# Patient Record
Sex: MALE | Race: WHITE | NOT HISPANIC OR LATINO | Employment: OTHER | URBAN - METROPOLITAN AREA
[De-identification: names, ages, dates, MRNs, and addresses within clinical notes are randomized per-mention and may not be internally consistent; named-entity substitution may affect disease eponyms.]

---

## 2021-11-05 ENCOUNTER — HOSPITAL ENCOUNTER (OUTPATIENT)
Dept: RADIOLOGY | Facility: MEDICAL CENTER | Age: 71
End: 2021-11-05
Attending: INTERNAL MEDICINE
Payer: COMMERCIAL

## 2021-11-05 DIAGNOSIS — R91.1 SOLITARY PULMONARY NODULE: ICD-10-CM

## 2021-11-05 PROCEDURE — A9552 F18 FDG: HCPCS

## 2022-07-25 ENCOUNTER — HOSPITAL ENCOUNTER (OUTPATIENT)
Dept: RADIATION ONCOLOGY | Facility: MEDICAL CENTER | Age: 72
End: 2022-07-25
Payer: COMMERCIAL

## 2022-07-25 ENCOUNTER — HOSPITAL ENCOUNTER (OUTPATIENT)
Dept: RADIOLOGY | Facility: MEDICAL CENTER | Age: 72
End: 2022-07-25

## 2022-07-25 ENCOUNTER — HOSPITAL ENCOUNTER (OUTPATIENT)
Dept: RADIOLOGY | Facility: MEDICAL CENTER | Age: 72
End: 2022-07-25
Attending: RADIOLOGY
Payer: COMMERCIAL

## 2022-07-25 ENCOUNTER — HOSPITAL ENCOUNTER (OUTPATIENT)
Dept: RADIATION ONCOLOGY | Facility: MEDICAL CENTER | Age: 72
End: 2022-07-31
Attending: RADIOLOGY
Payer: COMMERCIAL

## 2022-07-25 VITALS
OXYGEN SATURATION: 98 % | WEIGHT: 168.43 LBS | DIASTOLIC BLOOD PRESSURE: 65 MMHG | HEIGHT: 69 IN | BODY MASS INDEX: 24.95 KG/M2 | SYSTOLIC BLOOD PRESSURE: 110 MMHG | TEMPERATURE: 97.1 F | HEART RATE: 80 BPM

## 2022-07-25 DIAGNOSIS — R05.9 COUGH: ICD-10-CM

## 2022-07-25 DIAGNOSIS — C79.31 BRAIN METASTASIS: ICD-10-CM

## 2022-07-25 DIAGNOSIS — C34.32 SMALL CELL LUNG CANCER, LEFT LOWER LOBE (HCC): ICD-10-CM

## 2022-07-25 DIAGNOSIS — C79.31 BRAIN METASTASES: ICD-10-CM

## 2022-07-25 PROCEDURE — 77470 SPECIAL RADIATION TREATMENT: CPT | Mod: 26 | Performed by: RADIOLOGY

## 2022-07-25 PROCEDURE — 77263 THER RADIOLOGY TX PLNG CPLX: CPT | Performed by: RADIOLOGY

## 2022-07-25 PROCEDURE — 77334 RADIATION TREATMENT AID(S): CPT | Performed by: RADIOLOGY

## 2022-07-25 PROCEDURE — 700117 HCHG RX CONTRAST REV CODE 255: Performed by: RADIOLOGY

## 2022-07-25 PROCEDURE — 77290 THER RAD SIMULAJ FIELD CPLX: CPT | Mod: 26 | Performed by: RADIOLOGY

## 2022-07-25 PROCEDURE — A9576 INJ PROHANCE MULTIPACK: HCPCS | Performed by: RADIOLOGY

## 2022-07-25 PROCEDURE — 99205 OFFICE O/P NEW HI 60 MIN: CPT | Mod: 25 | Performed by: RADIOLOGY

## 2022-07-25 PROCEDURE — 700111 HCHG RX REV CODE 636 W/ 250 OVERRIDE (IP): Performed by: RADIOLOGY

## 2022-07-25 PROCEDURE — 77290 THER RAD SIMULAJ FIELD CPLX: CPT | Performed by: RADIOLOGY

## 2022-07-25 PROCEDURE — 70553 MRI BRAIN STEM W/O & W/DYE: CPT

## 2022-07-25 PROCEDURE — 77470 SPECIAL RADIATION TREATMENT: CPT | Performed by: RADIOLOGY

## 2022-07-25 PROCEDURE — 77334 RADIATION TREATMENT AID(S): CPT | Mod: 26 | Performed by: RADIOLOGY

## 2022-07-25 PROCEDURE — 99214 OFFICE O/P EST MOD 30 MIN: CPT | Mod: 25 | Performed by: RADIOLOGY

## 2022-07-25 PROCEDURE — 700111 HCHG RX REV CODE 636 W/ 250 OVERRIDE (IP)

## 2022-07-25 RX ORDER — CODEINE PHOSPHATE/GUAIFENESIN 10-100MG/5
5 LIQUID (ML) ORAL 3 TIMES DAILY PRN
Qty: 236 ML | Refills: 0 | Status: SHIPPED | OUTPATIENT
Start: 2022-07-25 | End: 2022-07-25 | Stop reason: SDUPTHER

## 2022-07-25 RX ORDER — LISINOPRIL 2.5 MG/1
2.5 TABLET ORAL 2 TIMES DAILY
COMMUNITY

## 2022-07-25 RX ORDER — SIMVASTATIN 40 MG
40 TABLET ORAL NIGHTLY
COMMUNITY

## 2022-07-25 RX ORDER — HEPARIN SODIUM (PORCINE) LOCK FLUSH IV SOLN 100 UNIT/ML 100 UNIT/ML
SOLUTION INTRAVENOUS
Status: DISCONTINUED
Start: 2022-07-25 | End: 2022-07-26 | Stop reason: HOSPADM

## 2022-07-25 RX ORDER — PREDNISONE 10 MG/1
10 TABLET ORAL DAILY
COMMUNITY
End: 2022-09-02

## 2022-07-25 RX ORDER — DIGOXIN 125 MCG
125 TABLET ORAL DAILY
COMMUNITY

## 2022-07-25 RX ORDER — TAMSULOSIN HYDROCHLORIDE 0.4 MG/1
0.4 CAPSULE ORAL
COMMUNITY

## 2022-07-25 RX ORDER — VITAMIN B COMPLEX
1000 TABLET ORAL DAILY
COMMUNITY

## 2022-07-25 RX ORDER — PREDNISONE 10 MG/1
10 TABLET ORAL
Qty: 100 TABLET | Refills: 1 | Status: SHIPPED | OUTPATIENT
Start: 2022-07-25 | End: 2022-09-02 | Stop reason: SDUPTHER

## 2022-07-25 RX ORDER — OMEPRAZOLE 20 MG/1
20 CAPSULE, DELAYED RELEASE ORAL DAILY
COMMUNITY

## 2022-07-25 RX ORDER — CODEINE PHOSPHATE/GUAIFENESIN 10-100MG/5
5 LIQUID (ML) ORAL 3 TIMES DAILY PRN
Qty: 210 ML | Refills: 0 | Status: SHIPPED | OUTPATIENT
Start: 2022-07-25 | End: 2022-08-08

## 2022-07-25 RX ORDER — METOPROLOL SUCCINATE 50 MG/1
50 TABLET, EXTENDED RELEASE ORAL 2 TIMES DAILY
COMMUNITY

## 2022-07-25 RX ORDER — FINASTERIDE 5 MG/1
5 TABLET, FILM COATED ORAL DAILY
COMMUNITY

## 2022-07-25 RX ORDER — HEPARIN SODIUM (PORCINE) LOCK FLUSH IV SOLN 100 UNIT/ML 100 UNIT/ML
300-500 SOLUTION INTRAVENOUS ONCE
Status: COMPLETED | OUTPATIENT
Start: 2022-07-25 | End: 2022-07-25

## 2022-07-25 RX ORDER — NITROGLYCERIN 0.4 MG/1
0.4 TABLET SUBLINGUAL
COMMUNITY

## 2022-07-25 RX ADMIN — GADOTERIDOL 15 ML: 279.3 INJECTION, SOLUTION INTRAVENOUS at 19:22

## 2022-07-25 RX ADMIN — HEPARIN 500 UNITS: 100 SYRINGE at 19:05

## 2022-07-25 ASSESSMENT — PAIN SCALES - GENERAL: PAINLEVEL: NO PAIN

## 2022-07-25 NOTE — PROGRESS NOTES
"Patient was seen today in clinic with Dr. Turner for Consult.  Vitals signs and weight were obtained and pain assessment was completed.  Allergies and medications were reviewed with the patient.      Vitals/Pain:  Vitals:    07/25/22 0933   BP: 110/65   Pulse: 80   Temp: 36.2 °C (97.1 °F)   SpO2: 98%   Weight: 76.4 kg (168 lb 6.9 oz)   Height: 1.753 m (5' 9\")   Pain Score: No pain        Allergies:   Patient has no known allergies.    Current Medications:  Current Outpatient Medications   Medication Sig Dispense Refill   • simvastatin (ZOCOR) 40 MG Tab Take 40 mg by mouth every evening.     • metoprolol SR (TOPROL XL) 50 MG TABLET SR 24 HR Take 50 mg by mouth 2 times a day.     • lisinopril (PRINIVIL) 2.5 MG Tab Take 2.5 mg by mouth 2 times a day.     • omeprazole (PRILOSEC) 20 MG delayed-release capsule Take 20 mg by mouth every day.     • rivaroxaban (XARELTO) 20 MG Tab tablet Take 20 mg by mouth with dinner.     • nitroglycerin (NITROSTAT) 0.4 MG SL Tab Place 0.4 mg under the tongue every 5 minutes as needed for Chest Pain.     • cyanocobalamin 100 MCG tablet Take 100 mcg by mouth every day.     • tamsulosin (FLOMAX) 0.4 MG capsule Take 0.4 mg by mouth 1/2 hour after breakfast.     • finasteride (PROSCAR) 5 MG Tab Take 5 mg by mouth every day.     • vitamin D3 (CHOLECALCIFEROL) 1000 Unit (25 mcg) Tab Take 1,000 Units by mouth every day.     • digoxin (LANOXIN) 125 MCG Tab Take 125 mcg by mouth every day.     • predniSONE (DELTASONE) 10 MG Tab Take 10 mg by mouth every day.       No current facility-administered medications for this encounter.           Adeline Browning R.N.  "

## 2022-07-25 NOTE — CT SIMULATION
PATIENT NAME Nick Duenas   PRIMARY PHYSICIAN No primary care provider on file. [id number]   REFERRING PHYSICIAN No ref. provider found 1950     Small cell lung cancer, left lower lobe (HCC)  Staging form: Lung, AJCC 8th Edition  - Clinical stage from 7/25/2022: Stage FORTINO (cT1b, cN2, cM1b) - Signed by Esteban Turner M.D. on 7/25/2022  Histologic grade (G): GX  Histologic grading system: 4 grade system         Treatment Planning CT Simulation      Order Questions     Question Answer Comment    Is this for a new course of treatment? Yes     Is this an Addendum? No     Implanted Device/Pacemaker No     Simulation Status Initial     Planned Start Date 8/3/2022     Treatment Site Brain     Laterality Axial     Treatment Technique SRS     Treatment Pattern/Frequency Single Fx     Concurrent Chemotherapy No     CT Technique 3D     Slice Thickness 1mm     Scan Extent Brain     Treatment Device(s) SRS Mask     Patient Attire Gown     Patient Position Supine     Patient Orientation Head First     Arm Position Down     Chin Position Neutral     Treatment Machine TB1 - STx     Treatment Image Guidance CBCT     Frequency (CBCT) Daily     Image Guidance Match Bone     Treatment Planning Image Fusion CT/MR     Special Physics Consult Stereotactic      High Dose     Other Orders Special Tx Procedure Weekly Physics Checl

## 2022-07-25 NOTE — CONSULTS
RADIATION ONCOLOGY CONSULT    Patient name:  Nick Duenas    Primary Physician:  No primary care provider on file. MRN: [id number]  CSN: [id number]   Referring physician:  Jonna, Sushma, M.D.  : 1950, 72 y.o.     DATE OF SERVICE: 2022    IDENTIFICATION: A 72 y.o. male with   Small cell lung cancer, left lower lobe (HCC)  Staging form: Lung, AJCC 8th Edition  - Clinical stage from 2022: Stage FORTINO (cT1b, cN2, cM1b) - Signed by Esteban Turner M.D. on 2022  Histologic grade (G): GX  Histologic grading system: 4 grade system    He is here at the kind request of Dr. Jonna, Sushma, M.D.      HISTORY OF PRESENT ILLNESS:  Patient originally presented in  with bladder cancer and had staging CT which showed a 1.7 cm left lower lobe mass and enlarged paratracheal nodes and right hilar lymph nodes and biopsy 2021 showed small cell lung cancer.  Patient had staging MRI brain 2021 which was negative as well as CT abdomen which showed no distant metastatic disease.  Patient had PET/CT 2021 which showed FDG uptake within left lower lobe and mediastinal lesion.  There was a vague area of uptake within the liver but a follow-up MRI liver 2021 showed vascular lesion.  Patient completed chemoradiation therapy 2021 through 2022 with 6 weeks of radiation by Dr. Moo Garcia.  Patient had posttreatment PET CT scan 2022 which showed previously left lower lobe pulmonary mass no longer present areas of hypermetabolic consolidation throughout the lungs bilaterally which is therapy treatment related changes.  No lymphadenopathy.  [facility] showed a new lesion of the brain 8 x 8 mm enhancing lesion in the right frontal region superiorly with questionable leptomeningeal findings..  Patient has had worsening cough starting last month, to the ER and was started on steroids for presumed  radiation pneumonitis.  He was started on 20 mg and tapered to 10 mg and has not stopped but still has cough and shortness of breath.  He denies any headaches, nausea vomiting or other neurologic symptoms.      PROBLEM LIST:  Patient Active Problem List   Diagnosis   • Brain metastases (HCC)   • Small cell lung cancer, left lower lobe (HCC)        PAST SURGICAL HISTORY:  Past Surgical History:   Procedure Laterality Date   • STENT PLACEMENT  2020   • CHOLECYSTECTOMY     • HIP ARTHROSCOPY Right    • TONSILLECTOMY         CURRENT MEDICATIONS:  Current Outpatient Medications   Medication Sig Dispense Refill   • simvastatin (ZOCOR) 40 MG Tab Take 40 mg by mouth every evening.     • metoprolol SR (TOPROL XL) 50 MG TABLET SR 24 HR Take 50 mg by mouth 2 times a day.     • lisinopril (PRINIVIL) 2.5 MG Tab Take 2.5 mg by mouth 2 times a day.     • omeprazole (PRILOSEC) 20 MG delayed-release capsule Take 20 mg by mouth every day.     • rivaroxaban (XARELTO) 20 MG Tab tablet Take 20 mg by mouth with dinner.     • nitroglycerin (NITROSTAT) 0.4 MG SL Tab Place 0.4 mg under the tongue every 5 minutes as needed for Chest Pain.     • cyanocobalamin 100 MCG tablet Take 100 mcg by mouth every day.     • tamsulosin (FLOMAX) 0.4 MG capsule Take 0.4 mg by mouth 1/2 hour after breakfast.     • finasteride (PROSCAR) 5 MG Tab Take 5 mg by mouth every day.     • vitamin D3 (CHOLECALCIFEROL) 1000 Unit (25 mcg) Tab Take 1,000 Units by mouth every day.     • digoxin (LANOXIN) 125 MCG Tab Take 125 mcg by mouth every day.     • predniSONE (DELTASONE) 10 MG Tab Take 10 mg by mouth every day.     • guaifenesin-codeine (TUSSI-ORGANIDIN NR) 100-10 MG/5ML syrup Take 5 mL by mouth 3 times a day as needed for Cough for up to 14 days. 236 mL 0   • predniSONE (DELTASONE) 10 MG Tab Take 1 Tablet by mouth 5 Times a Day. Decrease by one tablet every week. When you reach one tab, the following week take half tab for week then stop. 100 Tablet 1     No  "current facility-administered medications for this encounter.       ALLERGIES:    Patient has no known allergies.    FAMILY HISTORY:    NO fam hx of cancer    SOCIAL HISTORY:     reports that he quit smoking about 2 years ago. He quit after 30.00 years of use. He has never used smokeless tobacco. He reports previous alcohol use. He reports that he does not use drugs. He states lives in [place] with his wife Marcia. He is a retired fiber .     REVIEW OF SYSTEMS:    A complete review of systems taken. Pertinent items in HPI. All others negative.    PHYSICAL EXAM:    PERFORMANCE STATUS:  ECOG Performance Review 7/25/2022   ECOG Performance Status Restricted in physically strenuous activity but ambulatory and able to carry out work of a light or sedentary nature, e.g., light house work, office work   Some recent data might be hidden     Karnofsky Score 7/25/2022   Karnofsky Score 80   Some recent data might be hidden     /65   Pulse 80   Temp 36.2 °C (97.1 °F)   Ht 1.753 m (5' 9\")   Wt 76.4 kg (168 lb 6.9 oz)   SpO2 98%   BMI 24.87 kg/m²   Physical Exam  Vitals reviewed.   HENT:      Head: Normocephalic.      Mouth/Throat:      Mouth: Mucous membranes are moist.   Eyes:      Pupils: Pupils are equal, round, and reactive to light.   Cardiovascular:      Rate and Rhythm: Normal rate.   Pulmonary:      Effort: Pulmonary effort is normal.   Abdominal:      General: Abdomen is flat.   Musculoskeletal:         General: Normal range of motion.      Cervical back: Normal range of motion.   Neurological:      General: No focal deficit present.      Mental Status: He is alert.   Psychiatric:         Mood and Affect: Mood normal.          LABORATORY DATA:   No results found for: WBC, RBC, HEMOGLOBIN, HEMATOCRIT, MCV, MCH, MCHC, RDW, PLATELETCT, MPV, NEUTSPOLYS, LYMPHOCYTES, MONOCYTES, EOSINOPHILS, BASOPHILS, HYPOCHROMIA, ANISOCYTOSIS   No results found for: SODIUM, POTASSIUM, CHLORIDE, CO2, GLUCOSE, BUN, " CREATININE, BUNCREATRAT, GLOMRATE        RADIOLOGY DATA:  MR brain 7/6/22      [id number] BASE TO MID-THIGH    Result Date: 7/20/2022  1. Previous left lower lobe pulmonary mass is no longer present. 2. Areas of hypermetabolic consolidation throughout the lungs bilaterally which is favored to represent treatment-related changes. Recommend follow-up to exclude underlying metastatic disease. 3. No lymphadenopathy. Electronically Signed by: Mariella Curtis MD 7/20/2022 2:27 PM      IMPRESSION:    A 72 y.o. with  Small cell lung cancer, left lower lobe (HCC)  Staging form: Lung, AJCC 8th Edition  - Clinical stage from 7/25/2022: Stage FORTINO (cT1b, cN2, cM1b) - Signed by Esteban Turner M.D. on 7/25/2022  Histologic grade (G): GX  Histologic grading system: 4 grade system      RECOMMENDATIONS:   [facility] and consensus was MRI findings are consistent with solitary brain metastasis.  I will order a thin cut MRI brain for radiosurgery planning to ensure there is no leptomeningeal spread and that this is only an isolated lesion.    I explained my rationale for radiosurgery as a good option.  We discussed head immobilization, the need for detailed MRI imaging, the treatment planning process, and the actual radiosurgery treatment itself.  We discussed the goal is to provide durable control of the brain lesion while sparing as much as possible the surrounding neural structures.  We discussed the risks of treatment including: corticosteroid use or dependence due to radiation necrosis or symptomatic edema, tumor progression, the risks of chronic steroid use, seizure, neurologic injury, the need for surgical resection, and treatment related death.      We discussed the implications on overall prognosis of having brain metastasis.  We discussed the goals of care to prevent neurologic injury and death related to neurologic injury if possible.  We discussed that the disease is not  considered curable and that our treatments are trying to balance risk with benefit in that context.  We discussed the possible need for whole brain radiotherapy in the future depending on the potential pattern of spread of his disease and that part of our goal is to delay the use of whole brain radiotherapy with its attendant side effects but that if necessary treating the whole brain is a useful and appropriate treatment.    We discussed that the number of treatments will ultimately depend on the size of the mass by volumetric measurements.  We hope for one fraction of SRS but that depending on the volumetric size of the lesion that the treatment may consist of as many as 5 treatments using an SRT approach.    We depend on accurate MRI imaging for target delineation and avoidance of critical normal brain structures.  For that reason, he will need a higher resolution MRI of the brain with much finer cuts than the current diagnostic imaging.  We would prefer 1mm cuts with a T1+C or SPGR w contrast sequence for fusion with his stereotactic CT and use as the primary imaging for treatment planning.  It is possible that we will  other yet unseen lesions on the higher resolution scan.    In terms of his cough it appears he is being treated for radiation pneumonitis and he does have treatment-related changes in his PET CT scan.  Given his cough and shortness of breath I have recommended that we restart his prednisone at 50 mg daily for 1 week and taper 10 mg each week which I have given him a paper prescription for additionally I have given him a codeine prescription for cough.  I explained other possibilities for his cough could be GERD which she is already on omeprazole, allergies for which she can try over-the-counter Flonase and antihistamines and if over-the-counter measures do not work and I think we should work him up for an atypical pneumonia and potentially started on Bactrim.    Thank you for the  opportunity to participate in his care.  If any questions or comments, please do not hesitate in calling.    Orders Placed This Encounter   • MR-BRAIN-WITH & W/O   • REFERRAL TO ONCOLOGY NURSE NAVIGATOR   • simvastatin (ZOCOR) 40 MG Tab   • metoprolol SR (TOPROL XL) 50 MG TABLET SR 24 HR   • lisinopril (PRINIVIL) 2.5 MG Tab   • omeprazole (PRILOSEC) 20 MG delayed-release capsule   • rivaroxaban (XARELTO) 20 MG Tab tablet   • nitroglycerin (NITROSTAT) 0.4 MG SL Tab   • cyanocobalamin 100 MCG tablet   • tamsulosin (FLOMAX) 0.4 MG capsule   • finasteride (PROSCAR) 5 MG Tab   • vitamin D3 (CHOLECALCIFEROL) 1000 Unit (25 mcg) Tab   • digoxin (LANOXIN) 125 MCG Tab   • predniSONE (DELTASONE) 10 MG Tab   • guaifenesin-codeine (TUSSI-ORGANIDIN NR) 100-10 MG/5ML syrup   • predniSONE (DELTASONE) 10 MG Tab       CC: Dr. Sushma, Dr. Troy, Charity Calderon

## 2022-07-28 ENCOUNTER — PATIENT OUTREACH (OUTPATIENT)
Dept: OTHER | Facility: MEDICAL CENTER | Age: 72
End: 2022-07-28
Payer: MEDICARE

## 2022-07-28 NOTE — LETTER
[facility]   [address]  [place] [address]  Phone: [phone] - Fax: [phone]                [address] [place] [address] [place] [address]     Date: 07/28/22    Dear Jose,    I am a Cancer Nurse Navigator, a certified oncology nurse. My role is to assess any needs you may have with education, guidance and support. I am available to you and your family through any cancer treatment at [facility].       I am available to address your needs during your journey with the following services:     Care Coordination  I can assist you in facilitating communication between your cancer care treatment team to ensure timely treatment and follow-up.  I can also assist with transition of care back to your primary care provider, or other specialist, as needed.  My goal is to bridge gaps for you throughout the course of your active treatment.       Education Services  Understanding the recommended treatment course by your physician is key. I can provide educational resources personalized to your cancer diagnosis to help you understand your diagnosis and treatment. Please let me know if you would like to receive information about your diagnosis and treatment plan.  I am here to help.     Support Services/Resource Information  [facility] we offer a full scope of support services.  I can assist you with referral information to:  · Cancer Clinical Trials & Research  · Nutrition counseling  · Support groups  · Complementary Therapies such as Mind-Body Techniques Meditation  · Patient Financial Advocates  ·   · [facility], an [employer] affiliate office, our volunteers can assist you with accessing our lending library, support services information, head coverings and comfort items  · Community and national resources, included eligibility based kelsey assistance and pharmaceutical access programs, if you are in need of additional information.      Critical access hospital offers services that include:  · Behavioral Health  · Genetic counseling & testing  · Acupuncture  · Lymphedema prevention/treatment program  · Palliative care services.       I hope you have an excellent patient experience.  Please feel free to share with me your comments regarding the care you have received- we value your feedback.    Sincerely,     Ivania Lombardo R.N.  Cancer Nurse Navigator    Office: [phone]  Main:  [phone]  Email: [email or web address]

## 2022-08-02 ENCOUNTER — HOSPITAL ENCOUNTER (OUTPATIENT)
Dept: RADIATION ONCOLOGY | Facility: MEDICAL CENTER | Age: 72
End: 2022-08-31
Attending: RADIOLOGY
Payer: COMMERCIAL

## 2022-08-02 PROCEDURE — 77300 RADIATION THERAPY DOSE PLAN: CPT | Performed by: RADIOLOGY

## 2022-08-02 PROCEDURE — 77334 RADIATION TREATMENT AID(S): CPT | Performed by: RADIOLOGY

## 2022-08-02 PROCEDURE — 77334 RADIATION TREATMENT AID(S): CPT | Mod: 26 | Performed by: RADIOLOGY

## 2022-08-02 PROCEDURE — 77295 3-D RADIOTHERAPY PLAN: CPT | Mod: 26 | Performed by: RADIOLOGY

## 2022-08-02 PROCEDURE — 77300 RADIATION THERAPY DOSE PLAN: CPT | Mod: 26 | Performed by: RADIOLOGY

## 2022-08-02 PROCEDURE — 77295 3-D RADIOTHERAPY PLAN: CPT | Performed by: RADIOLOGY

## 2022-08-03 ENCOUNTER — HOSPITAL ENCOUNTER (OUTPATIENT)
Dept: RADIATION ONCOLOGY | Facility: MEDICAL CENTER | Age: 72
End: 2022-08-03
Payer: MEDICARE

## 2022-08-03 LAB
CHEMOTHERAPY INFUSION START DATE: NORMAL
CHEMOTHERAPY INFUSION START DATE: NORMAL
CHEMOTHERAPY INFUSION STOP DATE: NORMAL
CHEMOTHERAPY RECORDS: 22
CHEMOTHERAPY RECORDS: 22
CHEMOTHERAPY RECORDS: 2200
CHEMOTHERAPY RECORDS: 2200
CHEMOTHERAPY RECORDS: NORMAL
CHEMOTHERAPY RX CANCER: NORMAL
CHEMOTHERAPY RX CANCER: NORMAL
DATE 1ST CHEMO CANCER: NORMAL
DATE 1ST CHEMO CANCER: NORMAL
RAD ONC ARIA COURSE LAST TREATMENT DATE: NORMAL
RAD ONC ARIA COURSE LAST TREATMENT DATE: NORMAL
RAD ONC ARIA COURSE TREATMENT ELAPSED DAYS: NORMAL
RAD ONC ARIA COURSE TREATMENT ELAPSED DAYS: NORMAL
RAD ONC ARIA REFERENCE POINT DOSAGE GIVEN TO DATE: 22
RAD ONC ARIA REFERENCE POINT DOSAGE GIVEN TO DATE: 22
RAD ONC ARIA REFERENCE POINT DOSAGE GIVEN TO DATE: 25.69
RAD ONC ARIA REFERENCE POINT DOSAGE GIVEN TO DATE: 25.69
RAD ONC ARIA REFERENCE POINT ID: NORMAL
RAD ONC ARIA REFERENCE POINT SESSION DOSAGE GIVEN: 22
RAD ONC ARIA REFERENCE POINT SESSION DOSAGE GIVEN: 25.69

## 2022-08-03 PROCEDURE — 77280 THER RAD SIMULAJ FIELD SMPL: CPT | Performed by: RADIOLOGY

## 2022-08-03 PROCEDURE — 77280 THER RAD SIMULAJ FIELD SMPL: CPT | Mod: 26 | Performed by: RADIOLOGY

## 2022-08-03 PROCEDURE — 77370 RADIATION PHYSICS CONSULT: CPT | Performed by: RADIOLOGY

## 2022-08-03 PROCEDURE — 77372 SRS LINEAR BASED: CPT | Performed by: RADIOLOGY

## 2022-08-03 PROCEDURE — 77432 STEREOTACTIC RADIATION TRMT: CPT | Performed by: RADIOLOGY

## 2022-08-04 DIAGNOSIS — C79.31 BRAIN METASTASIS: ICD-10-CM

## 2022-08-11 ENCOUNTER — PATIENT OUTREACH (OUTPATIENT)
Dept: ONCOLOGY | Facility: MEDICAL CENTER | Age: 72
End: 2022-08-11
Payer: MEDICARE

## 2022-08-11 NOTE — PROGRESS NOTES
Follow up call placed to patient and spoke with wife, Marcia.  She reports patient is doing well so far after treatment.  She states he does still have a cough.  Reviewed role of navigator.  She denies current barriers.  Reviewed resources of classes and support group if needed.  Pt is connected with Dr Odom.  Will send contact information via [employer] since unable to take number at this time. Available as needed.

## 2022-09-02 ENCOUNTER — HOSPITAL ENCOUNTER (OUTPATIENT)
Dept: RADIATION ONCOLOGY | Facility: MEDICAL CENTER | Age: 72
End: 2022-09-30
Attending: RADIOLOGY
Payer: COMMERCIAL

## 2022-09-02 DIAGNOSIS — R05.9 COUGH: ICD-10-CM

## 2022-09-02 DIAGNOSIS — C34.32 SMALL CELL LUNG CANCER, LEFT LOWER LOBE (HCC): ICD-10-CM

## 2022-09-02 RX ORDER — PREDNISONE 10 MG/1
10 TABLET ORAL
Qty: 100 TABLET | Refills: 1 | Status: SHIPPED | OUTPATIENT
Start: 2022-09-02

## 2022-09-02 NOTE — PROGRESS NOTES
Telephone Appointment Visit   This telephone visit was initiated by the patient and they verbally consented.    Reason for Call:  Symptom Follow-up    TREATMENT SUMMARY:    Course: C1-SRS    Treatment Site Ref. ID Energy Dose/Fx (cGy) #Fx Dose Correction (cGy) Total Dose (cGy) Start Date End Date Elapsed Days   Brain_SRS Brain_SRS 6X-FFF 2,200 1 / 1 0 2,200 8/3/2022 8/3/2022 0       HPI:    Patient originally presented in September 21 with bladder cancer and had staging CT which showed a 1.7 cm left lower lobe mass and enlarged paratracheal nodes and right hilar lymph nodes and biopsy September 30, 2021 showed small cell lung cancer.  Patient had staging MRI brain November 4, 2021 which was negative as well as CT abdomen which showed no distant metastatic disease.  Patient had PET/CT November 5, 2021 which showed FDG uptake within left lower lobe and mediastinal lesion.  There was a vague area of uptake within the liver but a follow-up MRI liver November 14, 2021 showed vascular lesion.  Patient completed chemoradiation therapy November 24, 2021 through March 2022 with 6 weeks of radiation by Dr. Moo Garcia.  Patient had posttreatment PET CT scan July 20, 2022 which showed previously left lower lobe pulmonary mass no longer present areas of hypermetabolic consolidation throughout the lungs bilaterally which is therapy treatment related changes.  No lymphadenopathy.  [facility] which showed a new lesion of the brain 8 x 8 mm enhancing lesion in the right frontal region superiorly with questionable leptomeningeal findings..  Patient has had worsening cough starting last month, to the ER and was started on steroids for presumed radiation pneumonitis.  He was started on 20 mg and tapered to 10 mg and has not stopped but still has cough and shortness of breath.  He denies any headaches, nausea vomiting or other neurologic symptoms.     Interval History:  Patient has been doing well  after his radiosurgery 8/3/22.  He denies any headaches, nausea, vomiting.  He still has continuous cough which is slightly worsened on his prednisone taper.  He is down to 5 mg a day.    Labs / Images Reviewed:   none    Assessment and Plan:     1. Small cell lung cancer, left lower lobe (HCC)  - CT-CHEST,ABDOMEN,PELVIS WITH; Future    2. Cough  - predniSONE (DELTASONE) 10 MG Tab; Take 1 Tablet by mouth 5 Times a Day. Decrease by one tablet every week. When you reach one tab, the following week take half tab for week then stop.  Dispense: 100 Tablet; Refill: 1      Follow-up: recommend starting back at prednisone 20mg daily with slow taper once symptoms resolve, seems like he has a radiation pneumonitis, recommend follow up with Dr. Madrigal  -MRI brain 10/3/22  -CT CAP 10/32/22  -Sees Dr. Odom for med onc    Total Time Spent (mins): 5 minutes    Esteban Turner M.D.

## 2022-10-03 ENCOUNTER — HOSPITAL ENCOUNTER (OUTPATIENT)
Dept: RADIOLOGY | Facility: MEDICAL CENTER | Age: 72
End: 2022-10-03
Attending: RADIOLOGY
Payer: COMMERCIAL

## 2022-10-03 DIAGNOSIS — C79.31 BRAIN METASTASIS: ICD-10-CM

## 2022-10-03 PROCEDURE — 70553 MRI BRAIN STEM W/O & W/DYE: CPT

## 2022-10-03 PROCEDURE — 700117 HCHG RX CONTRAST REV CODE 255

## 2022-10-03 PROCEDURE — A9576 INJ PROHANCE MULTIPACK: HCPCS

## 2022-10-03 RX ADMIN — GADOTERIDOL 15 ML: 279.3 INJECTION, SOLUTION INTRAVENOUS at 15:36

## 2022-10-06 ENCOUNTER — HOSPITAL ENCOUNTER (OUTPATIENT)
Dept: RADIATION ONCOLOGY | Facility: MEDICAL CENTER | Age: 72
End: 2022-10-31
Attending: RADIOLOGY
Payer: COMMERCIAL

## 2022-10-06 DIAGNOSIS — C79.31 BRAIN METASTASIS: ICD-10-CM

## 2022-10-06 NOTE — PROGRESS NOTES
Telephone Appointment Visit   This telephone visit was initiated by the patient and they verbally consented.    Reason for Call:  Symptom Follow-up    TREATMENT SUMMARY:    Course: C1-SRS    Treatment Site Ref. ID Energy Dose/Fx (cGy) #Fx Dose Correction (cGy) Total Dose (cGy) Start Date End Date Elapsed Days   Brain_SRS Brain_SRS 6X-FFF 2,200 1 / 1 0 2,200 8/3/2022 8/3/2022 0     HPI:    Patient originally presented in September 21 with bladder cancer and had staging CT which showed a 1.7 cm left lower lobe mass and enlarged paratracheal nodes and right hilar lymph nodes and biopsy September 30, 2021 showed small cell lung cancer.  Patient had staging MRI brain November 4, 2021 which was negative as well as CT abdomen which showed no distant metastatic disease.  Patient had PET/CT November 5, 2021 which showed FDG uptake within left lower lobe and mediastinal lesion.  There was a vague area of uptake within the liver but a follow-up MRI liver November 14, 2021 showed vascular lesion.  Patient completed chemoradiation therapy November 24, 2021 through March 2022 with 6 weeks of radiation by Dr. Moo Garcia.  Patient had posttreatment PET CT scan July 20, 2022 which showed previously left lower lobe pulmonary mass no longer present areas of hypermetabolic consolidation throughout the lungs bilaterally which is therapy treatment related changes.  No lymphadenopathy.  [facility] which showed a new lesion of the brain 8 x 8 mm enhancing lesion in the right frontal region superiorly with questionable leptomeningeal findings..  Patient has had worsening cough starting last month, to the ER and was started on steroids for presumed radiation pneumonitis.  He was started on 20 mg and tapered to 10 mg and has not stopped but still has cough and shortness of breath.  He denies any headaches, nausea vomiting or other neurologic symptoms.     9/2/22  Patient has been doing well after his  radiosurgery 8/3/22.  He denies any headaches, nausea, vomiting.  He still has continuous cough which is slightly worsened on his prednisone taper.  He is down to 5 mg a day.     Interval History:  Patient well MRI brain October 3, 2022 showed disappearance of previously described 9 mm enhancing nodular mass of the right high frontal convexity.  Denies any headaches, nausea, vomiting or other neurologic symptoms.      Labs / Images Reviewed:   Results for orders placed during the hospital encounter of 10/03/22    MR-BRAIN-WITH & W/O    Impression  1.  Mild cerebral atrophy.  2.  Mild supratentorial white matter disease most consistent with microvascular ischemic change. Common findings for the patient's age.  3.  Disappearance of previously described 9 mm rounded enhancing nodular mass at the right high frontal convexity. This was initially thought to represent a meningioma. Complete response with disappearance of the lesion would be unusual for meningioma.  This is now thought more likely to represent a metastatic deposit with complete response.  4.  No new enhancing lesions.  5.  Moderate pontine ischemic gliosis.      Results for orders placed during the hospital encounter of 07/25/22    MR-BRAIN-WITH & W/O    Impression  1.  There is an approximately 9 mm sized likely extra axial, enhancing lesion in the right frontal region. This lesion demonstrates isointense signal on T2 and T1-weighted sequences. There is no adjacent white matter edema. There is also restricted  diffusion within the lesion. There are no other parenchymal enhancing lesions. These finding suggests that this lesion most likely represent extra-axial lesion such as meningioma. However follow-up study is recommended to ensure the stability and to rule  out metastasis.  2.  Moderate cerebral volume loss.  3.  Mild chronic microvascular ischemic disease.  4.  Brainstem gliosis.          Assessment and Plan:     1. Brain metastasis (HCC)  -  MR-BRAIN-WITH & W/O; Future      Follow-up: 11/3/22 after CT CAP  Plan on MRI brain in 3 months  [facility] not on active therapy at this time  Cough (radiation pneumonitis_ resolving on steroid taper per Dr. Calderon  Total Time Spent (mins): 5 minutes    Esteban Turner M.D.    CC: Dr. Calderon

## 2022-10-27 ENCOUNTER — HOSPITAL ENCOUNTER (OUTPATIENT)
Dept: LAB | Facility: MEDICAL CENTER | Age: 72
End: 2022-10-27
Attending: RADIOLOGY
Payer: COMMERCIAL

## 2022-10-27 LAB
ANION GAP SERPL CALC-SCNC: 11 MMOL/L (ref 7–16)
BUN SERPL-MCNC: 11 MG/DL (ref 8–22)
CALCIUM SERPL-MCNC: 9.3 MG/DL (ref 8.4–10.2)
CHLORIDE SERPL-SCNC: 101 MMOL/L (ref 96–112)
CO2 SERPL-SCNC: 24 MMOL/L (ref 20–33)
CREAT SERPL-MCNC: 0.88 MG/DL (ref 0.5–1.4)
FASTING STATUS PATIENT QL REPORTED: NORMAL
GFR SERPLBLD CREATININE-BSD FMLA CKD-EPI: 91 ML/MIN/1.73 M 2
GLUCOSE SERPL-MCNC: 272 MG/DL (ref 65–99)
POTASSIUM SERPL-SCNC: 4.6 MMOL/L (ref 3.6–5.5)
SODIUM SERPL-SCNC: 136 MMOL/L (ref 135–145)

## 2022-10-27 PROCEDURE — 36415 COLL VENOUS BLD VENIPUNCTURE: CPT

## 2022-10-27 PROCEDURE — 80048 BASIC METABOLIC PNL TOTAL CA: CPT

## 2022-10-31 ENCOUNTER — HOSPITAL ENCOUNTER (OUTPATIENT)
Dept: RADIOLOGY | Facility: MEDICAL CENTER | Age: 72
End: 2022-10-31
Attending: RADIOLOGY
Payer: COMMERCIAL

## 2022-10-31 DIAGNOSIS — C34.32 SMALL CELL LUNG CANCER, LEFT LOWER LOBE (HCC): ICD-10-CM

## 2022-10-31 PROCEDURE — 700117 HCHG RX CONTRAST REV CODE 255: Performed by: RADIOLOGY

## 2022-10-31 PROCEDURE — 71260 CT THORAX DX C+: CPT

## 2022-10-31 RX ADMIN — IOHEXOL 100 ML: 350 INJECTION, SOLUTION INTRAVENOUS at 10:24

## 2022-11-03 ENCOUNTER — HOSPITAL ENCOUNTER (OUTPATIENT)
Dept: RADIATION ONCOLOGY | Facility: MEDICAL CENTER | Age: 72
End: 2022-11-30
Attending: RADIOLOGY
Payer: MEDICARE

## 2022-11-03 VITALS — OXYGEN SATURATION: 96 % | BODY MASS INDEX: 25.84 KG/M2 | HEART RATE: 99 BPM | WEIGHT: 175 LBS

## 2022-11-03 PROCEDURE — 99214 OFFICE O/P EST MOD 30 MIN: CPT | Mod: 95 | Performed by: RADIOLOGY

## 2022-11-03 ASSESSMENT — ENCOUNTER SYMPTOMS
GASTROINTESTINAL NEGATIVE: 1
COUGH: 1
CARDIOVASCULAR NEGATIVE: 1
MUSCULOSKELETAL NEGATIVE: 1
CONSTITUTIONAL NEGATIVE: 1
EYES NEGATIVE: 1
NEUROLOGICAL NEGATIVE: 1
PSYCHIATRIC NEGATIVE: 1

## 2022-11-03 NOTE — PROGRESS NOTES
Telemedicine: Established Patient   This evaluation was conducted via [employer] [facility] using secure and encrypted videoconferencing technology. The patient was in their home in the [facility].    The patient's identity was confirmed and verbal consent was obtained for this virtual visit.    Subjective:   DATE OF SERVICE: 11/3/2022    IDENTIFICATION:   A 72 y.o. male with    Small cell lung cancer, left lower lobe (HCC)  Staging form: Lung, AJCC 8th Edition  - Clinical stage from 7/25/2022: Stage FORTINO (cT1b, cN2, cM1b) - Signed by Esteban Turner M.D. on 7/25/2022  Histologic grade (G): GX  Histologic grading system: 4 grade system      RADIATION SUMMARY:  [facility] Treatment Information          Some values may be hidden. Unless noted otherwise, only the newest values recorded on each date are displayed.           [facility] Treatment Summary 8/3/22   Course First Treatment Date 08/03/2022    Course Last Treatment Date 08/03/2022    Brain_SRS Plan from Course C1-SRS   Fraction 1 of 1    Elapsed Course Days 0 @ [id number]    Prescribed Fraction Dose 2,200 cGy    Prescribed Total Dose 2,200 cGy    Brain_SRS Reference Point from Course C1-SRS   Elapsed Course Days 0 @ [id number]    Session Dose --    Total Dose 2,200 cGy    Brain_SRS CP Reference Point from Course C1-SRS   Elapsed Course Days 0 @ [id number]    Session Dose --    Total Dose 2,569 cGy     Some values recorded on this date have been omitted.                 HISTORY OF PRESENT ILLNESS:  Patient originally presented in September 21 with bladder cancer and had staging CT which showed a 1.7 cm left lower lobe mass and enlarged paratracheal nodes and right hilar lymph nodes and biopsy September 30, 2021 showed small cell lung cancer.  Patient had staging MRI brain November 4, 2021 which was negative as well as CT abdomen which showed no distant metastatic disease.  Patient had PET/CT November 5, 2021 which showed FDG uptake within left lower lobe and mediastinal  lesion.  There was a vague area of uptake within the liver but a follow-up MRI liver November 14, 2021 showed vascular lesion.  Patient completed chemoradiation therapy November 24, 2021 through March 2022 with 6 weeks of radiation by Dr. Moo Garcia.  Patient had posttreatment PET CT scan July 20, 2022 which showed previously left lower lobe pulmonary mass no longer present areas of hypermetabolic consolidation throughout the lungs bilaterally which is therapy treatment related changes.  No lymphadenopathy.  [facility] which showed a new lesion of the brain 8 x 8 mm enhancing lesion in the right frontal region superiorly with questionable leptomeningeal findings..  Patient has had worsening cough starting last month, to the ER and was started on steroids for presumed radiation pneumonitis.  He was started on 20 mg and tapered to 10 mg and has not stopped but still has cough and shortness of breath.  He denies any headaches, nausea vomiting or other neurologic symptoms.    INTERVAL HISTORY:  Patient is doing well had MRI brain October 3, 2022 which showed disappearance of 9 mm brain lesion.  Patient had CT chest abdomen pelvis which shows no evidence of thoracic disease recurrence.  Overall patient is doing well and is off steroids and radiation pneumonitis resolved.  Mild cough no fevers.    Review of Systems   Constitutional: Negative.    HENT: Negative.     Eyes: Negative.    Respiratory:  Positive for cough.    Cardiovascular: Negative.    Gastrointestinal: Negative.    Genitourinary: Negative.    Musculoskeletal: Negative.    Skin: Negative.    Neurological: Negative.    Endo/Heme/Allergies: Negative.    Psychiatric/Behavioral: Negative.     All other systems reviewed and are negative.      No Known Allergies    Current medicines (including changes today)  Current Outpatient Medications   Medication Sig Dispense Refill    predniSONE (DELTASONE) 10 MG Tab Take 1 Tablet by  mouth 5 Times a Day. Decrease by one tablet every week. When you reach one tab, the following week take half tab for week then stop. 100 Tablet 1    simvastatin (ZOCOR) 40 MG Tab Take 40 mg by mouth every evening.      metoprolol SR (TOPROL XL) 50 MG TABLET SR 24 HR Take 50 mg by mouth 2 times a day.      lisinopril (PRINIVIL) 2.5 MG Tab Take 2.5 mg by mouth 2 times a day.      omeprazole (PRILOSEC) 20 MG delayed-release capsule Take 20 mg by mouth every day.      rivaroxaban (XARELTO) 20 MG Tab tablet Take 20 mg by mouth with dinner.      nitroglycerin (NITROSTAT) 0.4 MG SL Tab Place 0.4 mg under the tongue every 5 minutes as needed for Chest Pain.      cyanocobalamin 100 MCG tablet Take 100 mcg by mouth every day.      tamsulosin (FLOMAX) 0.4 MG capsule Take 0.4 mg by mouth 1/2 hour after breakfast.      finasteride (PROSCAR) 5 MG Tab Take 5 mg by mouth every day.      vitamin D3 (CHOLECALCIFEROL) 1000 Unit (25 mcg) Tab Take 1,000 Units by mouth every day.      digoxin (LANOXIN) 125 MCG Tab Take 125 mcg by mouth every day.       No current facility-administered medications for this encounter.       Patient Active Problem List    Diagnosis Date Noted    Brain metastases (HCC) 07/25/2022    Small cell lung cancer, left lower lobe (HCC) 07/25/2022    Brain metastasis (HCC) 07/25/2022       History reviewed. No pertinent family history.    He  has a past medical history of Atrial fibrillation (HCC), CAD (coronary artery disease), Hyperlipidemia, Hypertension, and STEMI (ST elevation myocardial infarction) (HCC) (01/01/2020).  He  has a past surgical history that includes stent placement (2020); tonsillectomy; hip arthroscopy (Right); and cholecystectomy.       Objective:   Pulse 99   Wt 79.4 kg (175 lb)   SpO2 96%   BMI 25.84 kg/m²     Physical Exam  Vitals reviewed.   HENT:      Head: Normocephalic.   Eyes:      Pupils: Pupils are equal, round, and reactive to light.   Pulmonary:      Effort: Pulmonary effort is  normal.   Musculoskeletal:         General: Normal range of motion.      Cervical back: Normal range of motion.   Neurological:      Mental Status: He is alert. Mental status is at baseline.   Psychiatric:         Mood and Affect: Mood normal.       Assessment and Plan:   Cancer Staging  Small cell lung cancer, left lower lobe (HCC)  Staging form: Lung, AJCC 8th Edition  - Clinical stage from 7/25/2022: Stage FORTINO (cT1b, cN2, cM1b) - Signed by Esteban Turner M.D. on 7/25/2022      Follow-up:   Follow up in 3 months with MRI brain. [facility]

## 2022-11-03 NOTE — PROGRESS NOTES
Patient was seen today in clinic with Dr. Turner for follow up.  Vitals signs and weight were obtained and pain assessment was completed.  Allergies and medications were reviewed with the patient.  Toxicities of treatment assessed.     Vitals/Pain:  There were no vitals filed for this visit.         Allergies:   Patient has no known allergies.    Current Medications:  Current Outpatient Medications   Medication Sig Dispense Refill    predniSONE (DELTASONE) 10 MG Tab Take 1 Tablet by mouth 5 Times a Day. Decrease by one tablet every week. When you reach one tab, the following week take half tab for week then stop. 100 Tablet 1    simvastatin (ZOCOR) 40 MG Tab Take 40 mg by mouth every evening.      metoprolol SR (TOPROL XL) 50 MG TABLET SR 24 HR Take 50 mg by mouth 2 times a day.      lisinopril (PRINIVIL) 2.5 MG Tab Take 2.5 mg by mouth 2 times a day.      omeprazole (PRILOSEC) 20 MG delayed-release capsule Take 20 mg by mouth every day.      rivaroxaban (XARELTO) 20 MG Tab tablet Take 20 mg by mouth with dinner.      nitroglycerin (NITROSTAT) 0.4 MG SL Tab Place 0.4 mg under the tongue every 5 minutes as needed for Chest Pain.      cyanocobalamin 100 MCG tablet Take 100 mcg by mouth every day.      tamsulosin (FLOMAX) 0.4 MG capsule Take 0.4 mg by mouth 1/2 hour after breakfast.      finasteride (PROSCAR) 5 MG Tab Take 5 mg by mouth every day.      vitamin D3 (CHOLECALCIFEROL) 1000 Unit (25 mcg) Tab Take 1,000 Units by mouth every day.      digoxin (LANOXIN) 125 MCG Tab Take 125 mcg by mouth every day.       No current facility-administered medications for this encounter.           Lauren Alarcon, Med Ass't

## 2023-01-06 ENCOUNTER — HOSPITAL ENCOUNTER (OUTPATIENT)
Dept: RADIOLOGY | Facility: MEDICAL CENTER | Age: 73
End: 2023-01-06
Attending: RADIOLOGY
Payer: COMMERCIAL

## 2023-01-06 DIAGNOSIS — C79.31 BRAIN METASTASIS: ICD-10-CM

## 2023-01-06 PROCEDURE — 70553 MRI BRAIN STEM W/O & W/DYE: CPT

## 2023-01-06 PROCEDURE — A9579 GAD-BASE MR CONTRAST NOS,1ML: HCPCS | Performed by: RADIOLOGY

## 2023-01-06 PROCEDURE — 700117 HCHG RX CONTRAST REV CODE 255: Performed by: RADIOLOGY

## 2023-01-06 RX ADMIN — GADOTERIDOL 15 ML: 279.3 INJECTION, SOLUTION INTRAVENOUS at 13:26

## 2023-01-09 ENCOUNTER — HOSPITAL ENCOUNTER (OUTPATIENT)
Dept: RADIATION ONCOLOGY | Facility: MEDICAL CENTER | Age: 73
End: 2023-01-31
Attending: RADIOLOGY
Payer: MEDICARE

## 2023-01-09 DIAGNOSIS — C79.31 BRAIN METASTASES: ICD-10-CM

## 2023-01-09 PROCEDURE — 99441 PR PHYSICIAN TELEPHONE EVALUATION 5-10 MIN: CPT | Mod: 93 | Performed by: RADIOLOGY

## 2023-01-09 NOTE — PROGRESS NOTES
Telephone Appointment Visit   This telephone visit was initiated by the patient and they verbally consented. They were on phone in [place]     Reason for Call:  Symptom Follow-up    HPI:       IDENTIFICATION:   A 72 y.o. male with    Small cell lung cancer, left lower lobe (HCC)  Staging form: Lung, AJCC 8th Edition  - Clinical stage from 7/25/2022: Stage FORTINO (cT1b, cN2, cM1b) - Signed by Esteban Turner M.D. on 7/25/2022  Histologic grade (G): GX  Histologic grading system: 4 grade system        RADIATION SUMMARY:  [facility] Treatment Information            Some values may be hidden. Unless noted otherwise, only the newest values recorded on each date are displayed.             [facility] Treatment Summary 8/3/22   Course First Treatment Date 08/03/2022    Course Last Treatment Date 08/03/2022    Brain_SRS Plan from Course C1-SRS   Fraction 1 of 1    Elapsed Course Days 0 @ [id number]    Prescribed Fraction Dose 2,200 cGy    Prescribed Total Dose 2,200 cGy    Brain_SRS Reference Point from Course C1-SRS   Elapsed Course Days 0 @ [id number]    Session Dose --    Total Dose 2,200 cGy    Brain_SRS CP Reference Point from Course C1-SRS   Elapsed Course Days 0 @ [id number]    Session Dose --    Total Dose 2,569 cGy     Some values recorded on this date have been omitted.                     HISTORY OF PRESENT ILLNESS:  Patient originally presented in September 21 with bladder cancer and had staging CT which showed a 1.7 cm left lower lobe mass and enlarged paratracheal nodes and right hilar lymph nodes and biopsy September 30, 2021 showed small cell lung cancer.  Patient had staging MRI brain November 4, 2021 which was negative as well as CT abdomen which showed no distant metastatic disease.  Patient had PET/CT November 5, 2021 which showed FDG uptake within left lower lobe and mediastinal lesion.  There was a vague area of uptake within the liver but a follow-up MRI liver November 14, 2021 showed vascular lesion.  Patient  completed chemoradiation therapy November 24, 2021 through March 2022 with 6 weeks of radiation by Dr. Moo Garcia.  Patient had posttreatment PET CT scan July 20, 2022 which showed previously left lower lobe pulmonary mass no longer present areas of hypermetabolic consolidation throughout the lungs bilaterally which is therapy treatment related changes.  No lymphadenopathy.  [facility] which showed a new lesion of the brain 8 x 8 mm enhancing lesion in the right frontal region superiorly with questionable leptomeningeal findings..  Patient has had worsening cough starting last month, to the ER and was started on steroids for presumed radiation pneumonitis.  He was started on 20 mg and tapered to 10 mg and has not stopped but still has cough and shortness of breath.  He denies any headaches, nausea vomiting or other neurologic symptoms.     11/3/22  Patient is doing well had MRI brain October 3, 2022 which showed disappearance of 9 mm brain lesion.  Patient had CT chest abdomen pelvis which shows no evidence of thoracic disease recurrence.  Overall patient is doing well and is off steroids and radiation pneumonitis resolved.  Mild cough no fevers.    INTERVAL HISTORY:  Patient had MRI brain done January 6, 2023 which showed no evidence of disease recurrence.  He still persistent cough and has tried Tessalon Perles, Mucinex, codeine with no relief.  He denies any fevers or shortness of breath.    Labs / Images Reviewed:   Results for orders placed during the hospital encounter of 01/06/23    MR-BRAIN-WITH & W/O    Impression  Age-related volume loss.    Nonspecific T2 hyperintensities are noted in the periventricular and deep white matter, most likely related to chronic microvascular ischemia.    No abnormal intracranial enhancement is seen.      Results for orders placed during the hospital encounter of 10/03/22    MR-BRAIN-WITH & W/O    Impression  1.  Mild cerebral atrophy.  2.   Mild supratentorial white matter disease most consistent with microvascular ischemic change. Common findings for the patient's age.  3.  Disappearance of previously described 9 mm rounded enhancing nodular mass at the right high frontal convexity. This was initially thought to represent a meningioma. Complete response with disappearance of the lesion would be unusual for meningioma.  This is now thought more likely to represent a metastatic deposit with complete response.  4.  No new enhancing lesions.  5.  Moderate pontine ischemic gliosis.      Results for orders placed during the hospital encounter of 07/25/22    MR-BRAIN-WITH & W/O    Impression  1.  There is an approximately 9 mm sized likely extra axial, enhancing lesion in the right frontal region. This lesion demonstrates isointense signal on T2 and T1-weighted sequences. There is no adjacent white matter edema. There is also restricted  diffusion within the lesion. There are no other parenchymal enhancing lesions. These finding suggests that this lesion most likely represent extra-axial lesion such as meningioma. However follow-up study is recommended to ensure the stability and to rule  out metastasis.  2.  Moderate cerebral volume loss.  3.  Mild chronic microvascular ischemic disease.  4.  Brainstem gliosis.          Assessment and Plan:     1. Brain metastases (HCC)  - MR-BRAIN-WITH & W/O; Future      Follow-up:3 months    Total Time Spent (mins): 6 minutes    Esteban Turner M.D.

## 2023-04-17 ENCOUNTER — HOSPITAL ENCOUNTER (OUTPATIENT)
Dept: RADIOLOGY | Facility: MEDICAL CENTER | Age: 73
End: 2023-04-17
Attending: RADIOLOGY
Payer: MEDICARE

## 2023-04-17 DIAGNOSIS — C79.31 MALIGNANT NEOPLASM METASTATIC TO BRAIN (HCC): ICD-10-CM

## 2023-04-20 ENCOUNTER — APPOINTMENT (OUTPATIENT)
Dept: RADIATION ONCOLOGY | Facility: MEDICAL CENTER | Age: 73
End: 2023-04-20
Attending: RADIOLOGY
Payer: COMMERCIAL

## 2023-05-18 ENCOUNTER — APPOINTMENT (OUTPATIENT)
Dept: RADIOLOGY | Facility: MEDICAL CENTER | Age: 73
End: 2023-05-18
Attending: RADIOLOGY
Payer: COMMERCIAL

## 2023-05-18 PROCEDURE — A9579 GAD-BASE MR CONTRAST NOS,1ML: HCPCS | Performed by: RADIOLOGY

## 2023-05-18 PROCEDURE — 70553 MRI BRAIN STEM W/O & W/DYE: CPT

## 2023-05-18 PROCEDURE — 700117 HCHG RX CONTRAST REV CODE 255: Performed by: RADIOLOGY

## 2023-05-18 RX ADMIN — GADOTERIDOL 15 ML: 279.3 INJECTION, SOLUTION INTRAVENOUS at 15:41

## 2023-05-22 ENCOUNTER — HOSPITAL ENCOUNTER (OUTPATIENT)
Dept: RADIATION ONCOLOGY | Facility: MEDICAL CENTER | Age: 73
End: 2023-05-31
Attending: RADIOLOGY
Payer: COMMERCIAL

## 2023-05-22 DIAGNOSIS — C79.31 MALIGNANT NEOPLASM METASTATIC TO BRAIN (HCC): ICD-10-CM

## 2023-05-22 NOTE — PROGRESS NOTES
I called patient to review MRI brain which shows stable right frontal lesion 5 mm in size was originally 9 mm in original scan from July 2022.  Of note it did disappear in January scan so is possible there is some radiation necrosis and we will order MRI brain for 3 months SRS protocol with ASL sequence to evaluate for perfusion to evaluate for radiation necrosis.

## 2023-08-22 ENCOUNTER — HOSPITAL ENCOUNTER (OUTPATIENT)
Dept: RADIOLOGY | Facility: MEDICAL CENTER | Age: 73
End: 2023-08-22
Attending: RADIOLOGY
Payer: COMMERCIAL

## 2023-08-22 DIAGNOSIS — C79.31 MALIGNANT NEOPLASM METASTATIC TO BRAIN (HCC): ICD-10-CM

## 2023-08-22 PROCEDURE — 700117 HCHG RX CONTRAST REV CODE 255: Performed by: RADIOLOGY

## 2023-08-22 PROCEDURE — A9579 GAD-BASE MR CONTRAST NOS,1ML: HCPCS | Performed by: RADIOLOGY

## 2023-08-22 PROCEDURE — 70553 MRI BRAIN STEM W/O & W/DYE: CPT

## 2023-08-22 RX ADMIN — GADOTERIDOL 15 ML: 279.3 INJECTION, SOLUTION INTRAVENOUS at 07:36

## 2023-08-24 ENCOUNTER — HOSPITAL ENCOUNTER (OUTPATIENT)
Dept: RADIATION ONCOLOGY | Facility: MEDICAL CENTER | Age: 73
End: 2023-08-31
Attending: RADIOLOGY
Payer: MEDICARE

## 2023-08-24 DIAGNOSIS — C79.31 MALIGNANT NEOPLASM METASTATIC TO BRAIN (HCC): ICD-10-CM

## 2023-08-24 PROCEDURE — 99441 PR PHYSICIAN TELEPHONE EVALUATION 5-10 MIN: CPT | Mod: 93 | Performed by: RADIOLOGY

## 2023-08-24 NOTE — PROGRESS NOTES
Telephone Appointment Visit   This telephone visit was initiated by the patient and they verbally consented in their house in [place].    Reason for Call:  Lab Follow-up    HPI:    IDENTIFICATION:   A 72 y.o. male with    Small cell lung cancer, left lower lobe (HCC)  Staging form: Lung, AJCC 8th Edition  - Clinical stage from 7/25/2022: Stage FORTINO (cT1b, cN2, cM1b) - Signed by Esteban Turner M.D. on 7/25/2022  Histologic grade (G): GX  Histologic grading system: 4 grade system        RADIATION SUMMARY:  [facility] Treatment Information            Some values may be hidden. Unless noted otherwise, only the newest values recorded on each date are displayed.             [facility] Treatment Summary 8/3/22   Course First Treatment Date 08/03/2022    Course Last Treatment Date 08/03/2022    Brain_SRS Plan from Course C1-SRS   Fraction 1 of 1    Elapsed Course Days 0 @ [id number]    Prescribed Fraction Dose 2,200 cGy    Prescribed Total Dose 2,200 cGy    Brain_SRS Reference Point from Course C1-SRS   Elapsed Course Days 0 @ [id number]    Session Dose --    Total Dose 2,200 cGy    Brain_SRS CP Reference Point from Course C1-SRS   Elapsed Course Days 0 @ [id number]    Session Dose --    Total Dose 2,569 cGy     Some values recorded on this date have been omitted.                     HISTORY OF PRESENT ILLNESS:  Patient originally presented in September 21 with bladder cancer and had staging CT which showed a 1.7 cm left lower lobe mass and enlarged paratracheal nodes and right hilar lymph nodes and biopsy September 30, 2021 showed small cell lung cancer.  Patient had staging MRI brain November 4, 2021 which was negative as well as CT abdomen which showed no distant metastatic disease.  Patient had PET/CT November 5, 2021 which showed FDG uptake within left lower lobe and mediastinal lesion.  There was a vague area of uptake within the liver but a follow-up MRI liver November 14, 2021 showed vascular lesion.  Patient completed  chemoradiation therapy November 24, 2021 through March 2022 with 6 weeks of radiation by Dr. Moo Garcia.  Patient had posttreatment PET CT scan July 20, 2022 which showed previously left lower lobe pulmonary mass no longer present areas of hypermetabolic consolidation throughout the lungs bilaterally which is therapy treatment related changes.  No lymphadenopathy.  [facility] which showed a new lesion of the brain 8 x 8 mm enhancing lesion in the right frontal region superiorly with questionable leptomeningeal findings..  Patient has had worsening cough starting last month, to the ER and was started on steroids for presumed radiation pneumonitis.  He was started on 20 mg and tapered to 10 mg and has not stopped but still has cough and shortness of breath.  He denies any headaches, nausea vomiting or other neurologic symptoms.     11/3/22  Patient is doing well had MRI brain October 3, 2022 which showed disappearance of 9 mm brain lesion.  Patient had CT chest abdomen pelvis which shows no evidence of thoracic disease recurrence.  Overall patient is doing well and is off steroids and radiation pneumonitis resolved.  Mild cough no fevers.     1/9/23  Patient had MRI brain done January 6, 2023 which showed no evidence of disease recurrence.  He still persistent cough and has tried Tessalon Perles, Mucinex, codeine with no relief.  He denies any fevers or shortness of breath.    INTERVAL HISTORY:  Patient had MRI brain August 22, 2023 which showed slight interval increase in size of posterior right frontal cortical lesion with some versus incomplete brain enhancement 8 mm in size slightly larger than previous exam.  Asymptomatic.  Patient did have recent CT abdomen which showed some abnormal mesenteric mass which [facility].         Labs / Images Reviewed:   Results for orders placed during the hospital encounter of 08/22/23    MR-BRAIN-WITH &  W/O    Impression  Interval slight increase in size of a posterior right frontal cortical lesion which demonstrates incomplete ring enhancement and measures approximately 8 mm in size, slightly larger than on the previous examination (previously 4 to 5 mm in size) 6.    Stable appearance of periventricular and deep white matter T2 hyperintensities, probably related to chronic microvascular ischemic changes and/or radiation therapy.      Results for orders placed during the hospital encounter of 04/17/23    MR-BRAIN-WITH & W/O    Impression  There is an approximately 5 mm sized incomplete ring-enhancing lesion in the right frontal cortex. This is new since the previous study dated 1/6/2023. This finding likely represents recurrence of previously seen metastasis. The previous MRI dated  7/25/2022 demonstrates 9 mm sized nodular lesion at this level. Please correlate with the clinical history of radiation at this level. If there history of radiation the possibility of delayed radiation induced inflammation cannot be excluded.      Results for orders placed during the hospital encounter of 01/06/23    MR-BRAIN-WITH & W/O    Impression  Age-related volume loss.    Nonspecific T2 hyperintensities are noted in the periventricular and deep white matter, most likely related to chronic microvascular ischemia.    No abnormal intracranial enhancement is seen.      Results for orders placed during the hospital encounter of 10/03/22    MR-BRAIN-WITH & W/O    Impression  1.  Mild cerebral atrophy.  2.  Mild supratentorial white matter disease most consistent with microvascular ischemic change. Common findings for the patient's age.  3.  Disappearance of previously described 9 mm rounded enhancing nodular mass at the right high frontal convexity. This was initially thought to represent a meningioma. Complete response with disappearance of the lesion would be unusual for meningioma.  This is now thought more likely to represent a  metastatic deposit with complete response.  4.  No new enhancing lesions.  5.  Moderate pontine ischemic gliosis.      Results for orders placed during the hospital encounter of 07/25/22    MR-BRAIN-WITH & W/O    Impression  1.  There is an approximately 9 mm sized likely extra axial, enhancing lesion in the right frontal region. This lesion demonstrates isointense signal on T2 and T1-weighted sequences. There is no adjacent white matter edema. There is also restricted  diffusion within the lesion. There are no other parenchymal enhancing lesions. These finding suggests that this lesion most likely represent extra-axial lesion such as meningioma. However follow-up study is recommended to ensure the stability and to rule  out metastasis.  2.  Moderate cerebral volume loss.  3.  Mild chronic microvascular ischemic disease.  4.  Brainstem gliosis.          Assessment and Plan:     1. Malignant neoplasm metastatic to brain (HCC)  - MR-BRAIN-WITH & W/O; Future      Follow-up: 3 month MRI brain with ASL    Total Time Spent (mins): 5 minutes 3PM-305PM    Esteban Turner M.D.

## 2023-08-28 ENCOUNTER — HOSPITAL ENCOUNTER (OUTPATIENT)
Dept: RADIOLOGY | Facility: MEDICAL CENTER | Age: 73
End: 2023-08-28
Attending: STUDENT IN AN ORGANIZED HEALTH CARE EDUCATION/TRAINING PROGRAM
Payer: MEDICARE

## 2023-11-25 ENCOUNTER — HOSPITAL ENCOUNTER (OUTPATIENT)
Dept: RADIOLOGY | Facility: MEDICAL CENTER | Age: 73
End: 2023-11-25
Attending: RADIOLOGY
Payer: COMMERCIAL

## 2023-11-25 DIAGNOSIS — C79.31 MALIGNANT NEOPLASM METASTATIC TO BRAIN (HCC): ICD-10-CM

## 2023-11-25 PROCEDURE — 700117 HCHG RX CONTRAST REV CODE 255: Performed by: RADIOLOGY

## 2023-11-25 PROCEDURE — 70553 MRI BRAIN STEM W/O & W/DYE: CPT

## 2023-11-25 PROCEDURE — A9579 GAD-BASE MR CONTRAST NOS,1ML: HCPCS | Performed by: RADIOLOGY

## 2023-11-25 PROCEDURE — 700111 HCHG RX REV CODE 636 W/ 250 OVERRIDE (IP): Performed by: RADIOLOGY

## 2023-11-25 RX ADMIN — HEPARIN 500 UNITS: 100 SYRINGE at 10:40

## 2023-11-25 RX ADMIN — GADOTERIDOL 15 ML: 279.3 INJECTION, SOLUTION INTRAVENOUS at 10:14

## 2023-11-27 ENCOUNTER — HOSPITAL ENCOUNTER (OUTPATIENT)
Dept: RADIATION ONCOLOGY | Facility: MEDICAL CENTER | Age: 73
End: 2023-11-30
Attending: RADIOLOGY
Payer: MEDICARE

## 2023-11-27 DIAGNOSIS — C79.31 MALIGNANT NEOPLASM METASTATIC TO BRAIN (HCC): ICD-10-CM

## 2023-11-27 PROCEDURE — 99442 PR PHYSICIAN TELEPHONE EVALUATION 11-20 MIN: CPT | Mod: 95 | Performed by: RADIOLOGY

## 2023-11-27 NOTE — PROGRESS NOTES
Telephone Appointment Visit   This telephone visit was initiated by the patient and they verbally consented in house     Reason for Call:  Lab Follow-up    HPI:    IDENTIFICATION:   A 73 y.o. male with    Small cell lung cancer, left lower lobe (HCC)  Staging form: Lung, AJCC 8th Edition  - Clinical stage from 7/25/2022: Stage FORTINO (cT1b, cN2, cM1b) - Signed by Esteban Turner M.D. on 7/25/2022  Histologic grade (G): GX  Histologic grading system: 4 grade system        RADIATION SUMMARY:  [facility] Treatment Information            Some values may be hidden. Unless noted otherwise, only the newest values recorded on each date are displayed.             [facility] Treatment Summary 8/3/22   Course First Treatment Date 08/03/2022    Course Last Treatment Date 08/03/2022    Brain_SRS Plan from Course C1-SRS   Fraction 1 of 1    Elapsed Course Days 0 @ [id number]    Prescribed Fraction Dose 2,200 cGy    Prescribed Total Dose 2,200 cGy    Brain_SRS Reference Point from Course C1-SRS   Elapsed Course Days 0 @ [id number]    Session Dose --    Total Dose 2,200 cGy    Brain_SRS CP Reference Point from Course C1-SRS   Elapsed Course Days 0 @ [id number]    Session Dose --    Total Dose 2,569 cGy     Some values recorded on this date have been omitted.                     HISTORY OF PRESENT ILLNESS:  Patient originally presented in September 21 with bladder cancer and had staging CT which showed a 1.7 cm left lower lobe mass and enlarged paratracheal nodes and right hilar lymph nodes and biopsy September 30, 2021 showed small cell lung cancer.  Patient had staging MRI brain November 4, 2021 which was negative as well as CT abdomen which showed no distant metastatic disease.  Patient had PET/CT November 5, 2021 which showed FDG uptake within left lower lobe and mediastinal lesion.  There was a vague area of uptake within the liver but a follow-up MRI liver November 14, 2021 showed vascular lesion.  Patient completed chemoradiation  therapy November 24, 2021 through March 2022 with 6 weeks of radiation by Dr. Moo Garcia.  Patient had posttreatment PET CT scan July 20, 2022 which showed previously left lower lobe pulmonary mass no longer present areas of hypermetabolic consolidation throughout the lungs bilaterally which is therapy treatment related changes.  No lymphadenopathy.  [facility] which showed a new lesion of the brain 8 x 8 mm enhancing lesion in the right frontal region superiorly with questionable leptomeningeal findings..  Patient has had worsening cough starting last month, to the ER and was started on steroids for presumed radiation pneumonitis.  He was started on 20 mg and tapered to 10 mg and has not stopped but still has cough and shortness of breath.  He denies any headaches, nausea vomiting or other neurologic symptoms.     11/3/22  Patient is doing well had MRI brain October 3, 2022 which showed disappearance of 9 mm brain lesion.  Patient had CT chest abdomen pelvis which shows no evidence of thoracic disease recurrence.  Overall patient is doing well and is off steroids and radiation pneumonitis resolved.  Mild cough no fevers.     1/9/23  Patient had MRI brain done January 6, 2023 which showed no evidence of disease recurrence.  He still persistent cough and has tried Tessalon Perles, Mucinex, codeine with no relief.  He denies any fevers or shortness of breath.     8/24/23  Patient had MRI brain August 22, 2023 which showed slight interval increase in size of posterior right frontal cortical lesion with some versus incomplete brain enhancement 8 mm in size slightly larger than previous exam.  Asymptomatic.  Patient did have recent CT abdomen which showed some abnormal mesenteric mass which [facility].      INTERVAL HISTORY:  Patient doing well had MRI brain November 25, 2023 which showed no evidence of disease recurrence likely radiation-induced inflammation.  Denies  any headaches, nausea, vomiting or new neurologic symptoms.  Currently on chemoimmunotherapy at [facility] with Dr. Mueller.     Labs / Images Reviewed:   Results for orders placed during the hospital encounter of 11/25/23    MR-BRAIN-WITH & W/O    Impression  Right posterior frontal hemorrhagic metastatic lesion post treatment is stable compared to the previous examination. Surrounding signal abnormality is noted on the FLAIR imaging, new from the prior study, probably related to postradiation changes.    No new lesion or other area of abnormal enhancement is seen.      Results for orders placed during the hospital encounter of 08/22/23    MR-BRAIN-WITH & W/O    Impression  Interval slight increase in size of a posterior right frontal cortical lesion which demonstrates incomplete ring enhancement and measures approximately 8 mm in size, slightly larger than on the previous examination (previously 4 to 5 mm in size) 6.    Stable appearance of periventricular and deep white matter T2 hyperintensities, probably related to chronic microvascular ischemic changes and/or radiation therapy.      Results for orders placed during the hospital encounter of 04/17/23    MR-BRAIN-WITH & W/O    Impression  There is an approximately 5 mm sized incomplete ring-enhancing lesion in the right frontal cortex. This is new since the previous study dated 1/6/2023. This finding likely represents recurrence of previously seen metastasis. The previous MRI dated  7/25/2022 demonstrates 9 mm sized nodular lesion at this level. Please correlate with the clinical history of radiation at this level. If there history of radiation the possibility of delayed radiation induced inflammation cannot be excluded.      Results for orders placed during the hospital encounter of 01/06/23    MR-BRAIN-WITH & W/O    Impression  Age-related volume loss.    Nonspecific T2 hyperintensities are noted in the periventricular and deep white matter, most likely  related to chronic microvascular ischemia.    No abnormal intracranial enhancement is seen.      Results for orders placed during the hospital encounter of 10/03/22    MR-BRAIN-WITH & W/O    Impression  1.  Mild cerebral atrophy.  2.  Mild supratentorial white matter disease most consistent with microvascular ischemic change. Common findings for the patient's age.  3.  Disappearance of previously described 9 mm rounded enhancing nodular mass at the right high frontal convexity. This was initially thought to represent a meningioma. Complete response with disappearance of the lesion would be unusual for meningioma.  This is now thought more likely to represent a metastatic deposit with complete response.  4.  No new enhancing lesions.  5.  Moderate pontine ischemic gliosis.      Results for orders placed during the hospital encounter of 07/25/22    MR-BRAIN-WITH & W/O    Impression  1.  There is an approximately 9 mm sized likely extra axial, enhancing lesion in the right frontal region. This lesion demonstrates isointense signal on T2 and T1-weighted sequences. There is no adjacent white matter edema. There is also restricted  diffusion within the lesion. There are no other parenchymal enhancing lesions. These finding suggests that this lesion most likely represent extra-axial lesion such as meningioma. However follow-up study is recommended to ensure the stability and to rule  out metastasis.  2.  Moderate cerebral volume loss.  3.  Mild chronic microvascular ischemic disease.  4.  Brainstem gliosis.           Assessment and Plan:     1. Malignant neoplasm metastatic to brain (HCC)  - MR-BRAIN-WITH & W/O; Future      Follow-up: 4 month MRI brain    Total Time Spent (mins): 11 mins    Esteban Turner M.D.

## 2024-03-27 ENCOUNTER — HOSPITAL ENCOUNTER (OUTPATIENT)
Dept: RADIOLOGY | Facility: MEDICAL CENTER | Age: 74
End: 2024-03-27
Attending: RADIOLOGY
Payer: COMMERCIAL

## 2024-03-27 DIAGNOSIS — C79.31 MALIGNANT NEOPLASM METASTATIC TO BRAIN (HCC): ICD-10-CM

## 2024-03-27 PROCEDURE — A9579 GAD-BASE MR CONTRAST NOS,1ML: HCPCS

## 2024-03-27 PROCEDURE — 700117 HCHG RX CONTRAST REV CODE 255

## 2024-03-27 PROCEDURE — 70553 MRI BRAIN STEM W/O & W/DYE: CPT

## 2024-03-27 RX ADMIN — GADOTERIDOL 15 ML: 279.3 INJECTION, SOLUTION INTRAVENOUS at 11:33

## 2024-03-29 ENCOUNTER — HOSPITAL ENCOUNTER (OUTPATIENT)
Dept: RADIATION ONCOLOGY | Facility: MEDICAL CENTER | Age: 74
End: 2024-03-29
Attending: RADIOLOGY
Payer: COMMERCIAL

## 2024-03-29 DIAGNOSIS — C34.32 SMALL CELL LUNG CANCER, LEFT LOWER LOBE (HCC): ICD-10-CM

## 2024-03-29 DIAGNOSIS — C79.31 MALIGNANT NEOPLASM METASTATIC TO BRAIN (HCC): ICD-10-CM

## 2024-03-29 NOTE — PROGRESS NOTES
Telephone Appointment Visit   This telephone visit was initiated by the patient and they verbally consented in house in [place]    Reason for Call:  Lab Follow-up    HPI:    IDENTIFICATION:   A 74 y.o. male with    Small cell lung cancer, left lower lobe (HCC)  Staging form: Lung, AJCC 8th Edition  - Clinical stage from 7/25/2022: Stage FORTINO (cT1b, cN2, cM1b) - Signed by Esteban Turner M.D. on 7/25/2022  Histologic grade (G): GX  Histologic grading system: 4 grade system        RADIATION SUMMARY:  [facility] Treatment Information            Some values may be hidden. Unless noted otherwise, only the newest values recorded on each date are displayed.             [facility] Treatment Summary 8/3/22   Course First Treatment Date 08/03/2022    Course Last Treatment Date 08/03/2022    Brain_SRS Plan from Course C1-SRS   Fraction 1 of 1    Elapsed Course Days 0 @ [id number]    Prescribed Fraction Dose 2,200 cGy    Prescribed Total Dose 2,200 cGy    Brain_SRS Reference Point from Course C1-SRS   Elapsed Course Days 0 @ [id number]    Session Dose --    Total Dose 2,200 cGy    Brain_SRS CP Reference Point from Course C1-SRS   Elapsed Course Days 0 @ [id number]    Session Dose --    Total Dose 2,569 cGy     Some values recorded on this date have been omitted.                     HISTORY OF PRESENT ILLNESS:  Patient originally presented in September 21 with bladder cancer and had staging CT which showed a 1.7 cm left lower lobe mass and enlarged paratracheal nodes and right hilar lymph nodes and biopsy September 30, 2021 showed small cell lung cancer.  Patient had staging MRI brain November 4, 2021 which was negative as well as CT abdomen which showed no distant metastatic disease.  Patient had PET/CT November 5, 2021 which showed FDG uptake within left lower lobe and mediastinal lesion.  There was a vague area of uptake within the liver but a follow-up MRI liver November 14, 2021 showed vascular lesion.  Patient completed  chemoradiation therapy November 24, 2021 through March 2022 with 6 weeks of radiation by Dr. Moo Garcia.  Patient had posttreatment PET CT scan July 20, 2022 which showed previously left lower lobe pulmonary mass no longer present areas of hypermetabolic consolidation throughout the lungs bilaterally which is therapy treatment related changes.  No lymphadenopathy.  [facility] which showed a new lesion of the brain 8 x 8 mm enhancing lesion in the right frontal region superiorly with questionable leptomeningeal findings..  Patient has had worsening cough starting last month, to the ER and was started on steroids for presumed radiation pneumonitis.  He was started on 20 mg and tapered to 10 mg and has not stopped but still has cough and shortness of breath.  He denies any headaches, nausea vomiting or other neurologic symptoms.     11/3/22  Patient is doing well had MRI brain October 3, 2022 which showed disappearance of 9 mm brain lesion.  Patient had CT chest abdomen pelvis which shows no evidence of thoracic disease recurrence.  Overall patient is doing well and is off steroids and radiation pneumonitis resolved.  Mild cough no fevers.     1/9/23  Patient had MRI brain done January 6, 2023 which showed no evidence of disease recurrence.  He still persistent cough and has tried Tessalon Perles, Mucinex, codeine with no relief.  He denies any fevers or shortness of breath.     8/24/23  Patient had MRI brain August 22, 2023 which showed slight interval increase in size of posterior right frontal cortical lesion with some versus incomplete brain enhancement 8 mm in size slightly larger than previous exam.  Asymptomatic.  Patient did have recent CT abdomen which showed some abnormal mesenteric mass which [facility].       11/27/23  Patient doing well had MRI brain November 25, 2023 which showed no evidence of disease recurrence likely radiation-induced inflammation.   Denies any headaches, nausea, vomiting or new neurologic symptoms.  Currently on chemoimmunotherapy at [facility] with Dr. Mueller.      INTERVAL HISTORY:  Patient is on maintenance immunotherapy cycle 8 and had interval MRI brain 4/27/2024 which showed no evidence of disease recurrence.  He denies any headaches, nausea, vomiting or other neurologic symptoms.  Has restaging CT scan planned for late April.    Labs / Images Reviewed:   Results for orders placed during the hospital encounter of 03/27/24    MR-BRAIN-WITH & W/O    Impression  Treatment changes noted in the right posterior frontal lesion which demonstrates minimal hemosiderin staining and minimal rim enhancement, unchanged from prior study.    Surrounding nonenhancing T2 signal abnormality is increased in extent compared to prior study, which may represent postradiation changes.      Results for orders placed during the hospital encounter of 11/25/23    MR-BRAIN-WITH & W/O    Impression  Right posterior frontal hemorrhagic metastatic lesion post treatment is stable compared to the previous examination. Surrounding signal abnormality is noted on the FLAIR imaging, new from the prior study, probably related to postradiation changes.    No new lesion or other area of abnormal enhancement is seen.      Results for orders placed during the hospital encounter of 08/22/23    MR-BRAIN-WITH & W/O    Impression  Interval slight increase in size of a posterior right frontal cortical lesion which demonstrates incomplete ring enhancement and measures approximately 8 mm in size, slightly larger than on the previous examination (previously 4 to 5 mm in size) 6.    Stable appearance of periventricular and deep white matter T2 hyperintensities, probably related to chronic microvascular ischemic changes and/or radiation therapy.      Results for orders placed during the hospital encounter of 04/17/23    MR-BRAIN-WITH & W/O    Impression  There is an approximately 5 mm  sized incomplete ring-enhancing lesion in the right frontal cortex. This is new since the previous study dated 1/6/2023. This finding likely represents recurrence of previously seen metastasis. The previous MRI dated  7/25/2022 demonstrates 9 mm sized nodular lesion at this level. Please correlate with the clinical history of radiation at this level. If there history of radiation the possibility of delayed radiation induced inflammation cannot be excluded.      Results for orders placed during the hospital encounter of 01/06/23    MR-BRAIN-WITH & W/O    Impression  Age-related volume loss.    Nonspecific T2 hyperintensities are noted in the periventricular and deep white matter, most likely related to chronic microvascular ischemia.    No abnormal intracranial enhancement is seen.      Results for orders placed during the hospital encounter of 10/03/22    MR-BRAIN-WITH & W/O    Impression  1.  Mild cerebral atrophy.  2.  Mild supratentorial white matter disease most consistent with microvascular ischemic change. Common findings for the patient's age.  3.  Disappearance of previously described 9 mm rounded enhancing nodular mass at the right high frontal convexity. This was initially thought to represent a meningioma. Complete response with disappearance of the lesion would be unusual for meningioma.  This is now thought more likely to represent a metastatic deposit with complete response.  4.  No new enhancing lesions.  5.  Moderate pontine ischemic gliosis.      Results for orders placed during the hospital encounter of 07/25/22    MR-BRAIN-WITH & W/O    Impression  1.  There is an approximately 9 mm sized likely extra axial, enhancing lesion in the right frontal region. This lesion demonstrates isointense signal on T2 and T1-weighted sequences. There is no adjacent white matter edema. There is also restricted  diffusion within the lesion. There are no other parenchymal enhancing lesions. These finding suggests  that this lesion most likely represent extra-axial lesion such as meningioma. However follow-up study is recommended to ensure the stability and to rule  out metastasis.  2.  Moderate cerebral volume loss.  3.  Mild chronic microvascular ischemic disease.  4.  Brainstem gliosis.           Assessment and Plan:     1. Small cell lung cancer, left lower lobe (HCC)  - MR-BRAIN-WITH & W/O; Future    2. Malignant neoplasm metastatic to brain (HCC)  - MR-BRAIN-WITH & W/O; Future      Follow-up: 5 months post MRI brain survillence, continue extracranial survillence with Dr. Mueller at [facility].    Total Time Spent (mins): 11mins    Esteban Turner M.D.